# Patient Record
Sex: MALE | Race: WHITE | NOT HISPANIC OR LATINO | Employment: UNEMPLOYED | URBAN - METROPOLITAN AREA
[De-identification: names, ages, dates, MRNs, and addresses within clinical notes are randomized per-mention and may not be internally consistent; named-entity substitution may affect disease eponyms.]

---

## 2019-04-29 LAB
LEFT EYE DIABETIC RETINOPATHY: NORMAL
RIGHT EYE DIABETIC RETINOPATHY: NORMAL

## 2019-05-17 LAB
LEFT EYE DIABETIC RETINOPATHY: NORMAL
RIGHT EYE DIABETIC RETINOPATHY: NORMAL

## 2019-06-03 ENCOUNTER — TELEPHONE (OUTPATIENT)
Dept: NEUROLOGY | Facility: CLINIC | Age: 74
End: 2019-06-03

## 2019-06-19 ENCOUNTER — CONSULT (OUTPATIENT)
Dept: NEUROLOGY | Facility: CLINIC | Age: 74
End: 2019-06-19
Payer: COMMERCIAL

## 2019-06-19 VITALS
HEART RATE: 75 BPM | HEIGHT: 68 IN | DIASTOLIC BLOOD PRESSURE: 90 MMHG | RESPIRATION RATE: 16 BRPM | SYSTOLIC BLOOD PRESSURE: 162 MMHG | BODY MASS INDEX: 28.75 KG/M2 | WEIGHT: 189.7 LBS

## 2019-06-19 DIAGNOSIS — R41.3 MEMORY LOSS: Primary | ICD-10-CM

## 2019-06-19 DIAGNOSIS — F32.A ANXIETY AND DEPRESSION: ICD-10-CM

## 2019-06-19 DIAGNOSIS — G47.33 OBSTRUCTIVE SLEEP APNEA (ADULT) (PEDIATRIC): ICD-10-CM

## 2019-06-19 DIAGNOSIS — R43.0 ANOSMIA: ICD-10-CM

## 2019-06-19 DIAGNOSIS — F41.9 ANXIETY AND DEPRESSION: ICD-10-CM

## 2019-06-19 PROCEDURE — 99203 OFFICE O/P NEW LOW 30 MIN: CPT | Performed by: PSYCHIATRY & NEUROLOGY

## 2019-06-19 RX ORDER — LOSARTAN POTASSIUM 100 MG/1
100 TABLET ORAL DAILY
Refills: 3 | COMMUNITY
Start: 2019-03-15

## 2019-06-19 RX ORDER — AMLODIPINE BESYLATE 5 MG/1
5 TABLET ORAL DAILY
COMMUNITY
Start: 2019-03-13

## 2019-06-19 RX ORDER — TAMSULOSIN HYDROCHLORIDE 0.4 MG/1
CAPSULE ORAL
COMMUNITY
Start: 2019-04-11

## 2019-06-19 RX ORDER — CYCLOSPORINE 0.5 MG/ML
EMULSION OPHTHALMIC
Refills: 12 | COMMUNITY
Start: 2019-05-29

## 2019-06-19 RX ORDER — SIMVASTATIN 20 MG
TABLET ORAL
COMMUNITY
Start: 2019-03-28

## 2019-06-19 RX ORDER — MINERAL OIL AND WHITE PETROLATUM 30; 940 MG/G; MG/G
OINTMENT OPHTHALMIC DAILY
COMMUNITY

## 2019-08-22 ENCOUNTER — HOSPITAL ENCOUNTER (OUTPATIENT)
Dept: MRI IMAGING | Facility: HOSPITAL | Age: 74
Discharge: HOME | End: 2019-08-22
Payer: COMMERCIAL

## 2019-08-22 ENCOUNTER — HOSPITAL ENCOUNTER (OUTPATIENT)
Dept: NEUROLOGY | Facility: HOSPITAL | Age: 74
Discharge: HOME | End: 2019-08-22
Payer: COMMERCIAL

## 2019-08-22 DIAGNOSIS — R41.3 MEMORY LOSS: ICD-10-CM

## 2019-08-22 DIAGNOSIS — F32.A ANXIETY AND DEPRESSION: ICD-10-CM

## 2019-08-22 DIAGNOSIS — F41.9 ANXIETY AND DEPRESSION: ICD-10-CM

## 2019-08-22 PROCEDURE — 95819 EEG AWAKE AND ASLEEP: CPT | Performed by: PSYCHIATRY & NEUROLOGY

## 2019-08-22 PROCEDURE — 70551 MRI BRAIN STEM W/O DYE: CPT

## 2019-08-22 PROCEDURE — 95816 EEG AWAKE AND DROWSY: CPT

## 2019-08-22 NOTE — LETTER
[address] [place] [address]  Sander Asencio      August 23, 2019    MRN: [id number]     Phone: [phone]     Dear Mr  Zeina Hayes recently had a(n) MRI performed  August 22, 2019 at  [address] [place] [address] that was requested by Dion Drake MD  The study was reviewed by a radiologist, which is a physician who specializes in medical imaging  The radiologist issued a report describing his or her findings  In that report there was a finding that the radiologist felt warranted further discussion with your health care provider and that discussion would be beneficial to you  The results were sent to Dion Drake MD on 8/23/2019  We recommend that you contact Dion Drake MD  or set up an appointment to discuss the results of the imaging test  If you have already heard from Dion Drake MD regarding the results of your study, you can disregard this letter  This letter is not meant to alarm you, but intended to encourage you to follow-up on your results with the provider that sent you for the imaging study  In addition, we have enclosed answers to frequently asked questions by other patients who have also received a letter to review results with their health care provider (see page two)  Thank you for choosing [address] [place] [address] for your medical imaging needs  FREQUENTLY ASKED QUESTIONS    1  Why am I receiving this letter? [facility] requires us to notify patients who have findings on imaging exams that may require more testing or follow-up with a health professional within the next 3 months  2  How serious is the finding on the imaging test?  This letter is sent to all patients who may need follow-up or more testing within the next 3 months  Receiving this letter does not necessarily mean you have a life-threatening imaging finding or disease  Recommendations in the radiologists imaging report are general in nature and it is up to your healthcare provider to say whether those recommendations make sense for your situation  You are strongly encouraged to talk to your health care provider about the results and ask whether additional steps need to be taken  3  Where can I get a copy of the final report for my recent radiology exam?  To get a full copy of the report you can access your records online at [email or web address] or please contact [address] [place] [address] Department at [phone] Monday through Friday between 8 am and 6 pm          4  What do I need to do now? Please contact your health care provider who requested the imaging study to discuss what further actions (if any) are needed  You may have already heard from (your ordering provider) in regard to this test in which case you can disregard this letter  NOTICE IN ACCORDANCE WITH THE [facility] PATIENT TEST RESULT INFORMATION ACT OF 2018    You are receiving this notice as a result of a determination by your diagnostic imaging service that further discussions of your test results are warranted and would be beneficial to you  The complete results of your test or tests have been or will be sent to the health care practitioner that ordered the test or tests  It is recommended that you contact your health care practitioner to discuss your results as soon as possible

## 2019-08-23 DIAGNOSIS — I63.511 ACUTE RIGHT ARTERIAL ISCHEMIC STROKE, MCA (MIDDLE CEREBRAL ARTERY) (HCC): Primary | ICD-10-CM

## 2019-08-23 NOTE — RESULT ENCOUNTER NOTE
Pt does not have MyChart and I am covering for Dr Merle Finnegan' inbox  Received call from Radiology for alert on Pt's MRI scan - he has a small punctate diffusion restriction lesion (small stroke) that may be recent to a week or two ago  It is in a location that is not expected to contribute much to his memory symptoms  However, given his present vascular risk factors as noted by Dr Merle Finnegan, he should have a stroke workup done to find out the cause  He has no known hx of stroke or TIA per note  Also, the EEG done yesterday appeared normal      Will be ordering:   - CT Angiogram of the head and neck  - Echocardiogram trans thoracic  - lipid panel  And advise taking Aspirin 81mg daily until otherwise instructed  Thank you

## 2019-08-28 ENCOUNTER — OFFICE VISIT (OUTPATIENT)
Dept: NEUROLOGY | Facility: CLINIC | Age: 74
End: 2019-08-28
Payer: COMMERCIAL

## 2019-08-28 ENCOUNTER — TELEPHONE (OUTPATIENT)
Dept: NEUROLOGY | Facility: CLINIC | Age: 74
End: 2019-08-28

## 2019-08-28 VITALS
RESPIRATION RATE: 16 BRPM | SYSTOLIC BLOOD PRESSURE: 162 MMHG | HEART RATE: 82 BPM | BODY MASS INDEX: 28.31 KG/M2 | HEIGHT: 68 IN | DIASTOLIC BLOOD PRESSURE: 84 MMHG | WEIGHT: 186.8 LBS

## 2019-08-28 DIAGNOSIS — R41.3 MEMORY LOSS: Primary | ICD-10-CM

## 2019-08-28 DIAGNOSIS — I63.81 LACUNAR STROKE (HCC): ICD-10-CM

## 2019-08-28 DIAGNOSIS — F41.9 ANXIETY AND DEPRESSION: ICD-10-CM

## 2019-08-28 DIAGNOSIS — G47.33 OBSTRUCTIVE SLEEP APNEA (ADULT) (PEDIATRIC): ICD-10-CM

## 2019-08-28 DIAGNOSIS — F32.A ANXIETY AND DEPRESSION: ICD-10-CM

## 2019-08-28 DIAGNOSIS — R90.89 ABNORMAL BRAIN MRI: ICD-10-CM

## 2019-08-28 PROCEDURE — 99214 OFFICE O/P EST MOD 30 MIN: CPT | Performed by: PSYCHIATRY & NEUROLOGY

## 2019-08-28 RX ORDER — ASPIRIN 81 MG/1
81 TABLET ORAL DAILY
COMMUNITY

## 2019-08-28 RX ORDER — ZOLPIDEM TARTRATE 5 MG/1
5 TABLET ORAL
Refills: 3 | COMMUNITY
Start: 2019-08-02

## 2019-08-28 NOTE — TELEPHONE ENCOUNTER
----- Message from Slick Ennis MD sent at 8/23/2019  1:41 PM EDT -----  Pt does not have MyChart and I am covering for Dr Carlos Roque' inbox  Received call from Radiology for alert on Pt's MRI scan - he has a small punctate diffusion restriction lesion (small stroke) that may be recent to a week or two ago  It is in a location that is not expected to contribute much to his memory symptoms  However, given his present vascular risk factors as noted by Dr Carlos Roque, he should have a stroke workup done to find out the cause  He has no known hx of stroke or TIA per note  Also, the EEG done yesterday appeared normal      Will be ordering:   - CT Angiogram of the head and neck  - Echocardiogram trans thoracic  - lipid panel  And advise taking Aspirin 81mg daily until otherwise instructed  Thank you

## 2019-08-28 NOTE — PROGRESS NOTES
Patient ID: Glenys Phan is a 72 y o  male  Assessment/Plan:    Disturbance of memory       Obstructive sleep apnea (adult) (pediatric)       Anxiety and depression       Abnormal brain MRI      Again, my major suspicion is that the most significant culprits involved with his described memory issues are related to his at one point untreated obstructive sleep apnea as well as an ongoing issue with depression  He has now been faithfully utilizing his CPAP machine and describes himself as feeling more alert, with clearer thinking and his daughter agreed  Both he and she also feel that his memory has improved as well  With regard to his depression, he feels that this is becoming less evident as he moves further past the loss of his wife  However, there still appears to be an element lingering depression as per the daughter's observation  There may well be a small vessel cerebrovascular component to his ongoing memory issues  He does have scattered microangiopathic change  Concern was raised as result of a tiny foci of subacute ischemia involving the right centrum semiovale apparent on his brain MRI  I suspect that this is simply an incidental finding as chronic changes will at some point in time be subacute and he offers no symptoms to suggest any clinical correlate  Nonetheless, he will now undergo a more broad stroke based evaluation  --recently had a carotid ultrasound which reportedly demonstrated no flow consequential stenosis  Will be obtained those results for formal review  --to further assess, intracranial MRA along with 2D echocardiogram   --fasting lipid profile  --continue his daily 81 mg aspirin along with his statin  --will be meeting with his PCP early next month to further discuss his depression, which although improved still is apparent  --she will continue to religiously use his CPAP    --above reviewed in detail with patient and with daughter and both acknowledged an understanding  I spent a total of 40 min with the patient with greater than 50% of that time spent counseling and coordinating his care, specifically discussing his diagnosis, additional tests, and discussing the case with his care team, as detailed above  He will follow up after completion of his additional study  Subjective:    HPI  The patient, now 72years of age, returns to further assess the status of his described memory issues, which is the original reason for his presentation  He was accompanied today by his daughter, Gurdeep Sanchez  After his initial evaluation, he was felt to have significant components from depressio and untreated obstructive sleep apnea  He has been faithfully using his CPAP and describes himself as feeling overall much better, describing himself as brighter during the day, clear thinking, and improved memory  His daughter agreed  He also feels that his depression although still a bit problematic has improved this further along he has moved from the death of his wife  Is functioning at a high level  Did have his additional study performed  Results were reviewed with patient and his daughter  TSH normal at 2 010  B12 normal at 490  Folate normal at 11 60  Routine EEG study was normal   MRI brain did demonstrate scattered microangiopathic changes of chronic nature  In addition, there was a tiny focus of change in the right centrum semiovale suggestive of a subacute ischemic injury  Reportedly had a carotid ultrasound performed this past spring and according to patient and daughter the study demonstrated no evident flow consequential stenosis  However, I will be requesting a formal report for review  Past Medical History:   Diagnosis Date    Anxiety and depression     Arthritis     Blurred vision     Diabetes mellitus (HCC)     Dry eyes     Eye pain     Hearing loss     Hypertension     Memory loss     Migraines     Sinus congestion      History reviewed  No pertinent surgical history  Social History     Socioeconomic History    Marital status:      Spouse name: None    Number of children: None    Years of education: None    Highest education level: None   Occupational History    None   Social Needs    Financial resource strain: None    Food insecurity:     Worry: None     Inability: None    Transportation needs:     Medical: None     Non-medical: None   Tobacco Use    Smoking status: Never Smoker    Smokeless tobacco: Never Used   Substance and Sexual Activity    Alcohol use:  Yes    Drug use: Never    Sexual activity: None   Lifestyle    Physical activity:     Days per week: None     Minutes per session: None    Stress: None   Relationships    Social connections:     Talks on phone: None     Gets together: None     Attends [employer] service: None     Active member of club or organization: None     Attends meetings of clubs or organizations: None     Relationship status: None    Intimate partner violence:     Fear of current or ex partner: None     Emotionally abused: None     Physically abused: None     Forced sexual activity: None   Other Topics Concern    None   Social History Narrative    None     Family History   Problem Relation Age of Onset    Dementia Mother     Heart attack Father      No Known Allergies    Current Outpatient Medications:     amLODIPine (NORVASC) 5 mg tablet, Take 5 mg by mouth daily, Disp: , Rfl:     aspirin (ECOTRIN LOW STRENGTH) 81 mg EC tablet, Take 81 mg by mouth daily, Disp: , Rfl:     losartan (COZAAR) 100 MG tablet, Take 100 mg by mouth daily, Disp: , Rfl: 3    metFORMIN (GLUCOPHAGE) 500 mg tablet, TAKE 1 TABLET BY MOUTH EVERY DAY WITH SUPPER, Disp: , Rfl:     RESTASIS 0 05 % ophthalmic emulsion, INSTILL 1 DROP INTO BOTH EYES TWICE A DAY, Disp: , Rfl: 12    simvastatin (ZOCOR) 20 mg tablet, TAKE 1 TABLET BY MOUTH EVERY DAY, Disp: , Rfl:     tamsulosin (FLOMAX) 0 4 mg, TAKE 2 CAPSULES BY MOUTH EVERY DAY, Disp: , Rfl:     White Petrolatum-Mineral Oil (SYSTANE NIGHTTIME) OINT, daily, Disp: , Rfl:     zolpidem (AMBIEN) 5 mg tablet, Take 5 mg by mouth daily at bedtime as needed, Disp: , Rfl: 3    Objective:    Blood pressure 162/84, pulse 82, resp  rate 16, height 5' 7 5" (1 715 m), weight 84 7 kg (186 lb 12 8 oz)  Physical Exam  Head normocephalic  No audible anterior neck bruits  Lungs clear to auscultation  Rhythm regular  GI (abdomen) soft nontender  No significant lower extremity edema  Neurological Exam  Alert  Very pleasantly interactive and appropriately conversational  No dysarthria  Answered correctly when asked name and birth date  Correctly stated the year, the month and the date date  Correctly stated his location  No difficulties with naming or repetition  Did well with proverb interpretation  Able to accurately perform simple calculations  3/3 object recall  Formal neuro cognitive testing was not undertaken today as was just done on his initial appointment  In review he scored a perfect 30/30 on MMSE, a very acceptable 17/18 on a frontal assessment battery and 4/4 on clock draw  His depression scale on his initial appointment was 18/30, suggesting ongoing moderate depression  Depression scale re-administered today with his score at 9/30 correlate with his history of slowly improving depression  Gait independent  Full symmetrical strength throughout the 4 extremities  Tone normal   No tone increase or cogwheeling with contralateral distraction maneuver  ROS:    Review of Systems   Constitutional: Positive for fatigue  Negative for appetite change and fever  HENT: Negative  Negative for hearing loss, tinnitus, trouble swallowing and voice change  Eyes: Negative  Negative for photophobia and pain  Respiratory: Negative  Negative for shortness of breath  Cardiovascular: Negative  Negative for palpitations  Gastrointestinal: Negative    Negative for nausea and vomiting  Endocrine: Negative  Negative for cold intolerance and heat intolerance  Genitourinary: Negative  Negative for dysuria, frequency and urgency  Musculoskeletal: Negative  Negative for myalgias and neck pain  Skin: Negative  Negative for rash  Neurological: Negative  Negative for dizziness, tremors, seizures, syncope, facial asymmetry, speech difficulty, weakness, light-headedness, numbness and headaches  Hematological: Negative  Does not bruise/bleed easily  Psychiatric/Behavioral: Positive for confusion and sleep disturbance  Negative for hallucinations  Memory issues       I personally reviewed the ROS that was entered by the medical assistant  *Please note this document was created using voice recognition software and may contain sound-alike word errors  *

## 2019-08-28 NOTE — ASSESSMENT & PLAN NOTE
Again, my major suspicion is that the most significant culprits involved with his described memory issues are related to his at one point untreated obstructive sleep apnea as well as an ongoing issue with depression  He has now been faithfully utilizing his CPAP machine and describes himself as feeling more alert, with clearer thinking and his daughter agreed  Both he and she also feel that his memory has improved as well  With regard to his depression, he feels that this is becoming less evident as he moves further past the loss of his wife  However, there still appears to be an element lingering depression as per the daughter's observation  There may well be a small vessel cerebrovascular component to his ongoing memory issues  He does have scattered microangiopathic change  Concern was raised as result of a tiny foci of subacute ischemia involving the right centrum semiovale apparent on his brain MRI  I suspect that this is simply an incidental finding as chronic changes will at some point in time be subacute and he offers no symptoms to suggest any clinical correlate  Nonetheless, he will now undergo a more broad stroke based evaluation  --recently had a carotid ultrasound which reportedly demonstrated no flow consequential stenosis  Will be obtained those results for formal review  --to further assess, intracranial MRA along with 2D echocardiogram   --fasting lipid profile  --continue his daily 81 mg aspirin along with his statin  --will be meeting with his PCP early next month to further discuss his depression, which although improved still is apparent  --she will continue to religiously use his CPAP  --above reviewed in detail with patient and with daughter and both acknowledged an understanding

## 2019-09-06 NOTE — TELEPHONE ENCOUNTER
Called and Left a message on pt's daughter's  answering machine for a call back     Letter mailed home

## 2019-09-12 NOTE — TELEPHONE ENCOUNTER
Pt's daughter called back  Made her aware of below    She states that after MRI was done they saw dr garcia on 8/28 and results were address, additional testing ordered ans scheduled

## 2019-09-20 ENCOUNTER — LAB (OUTPATIENT)
Dept: LAB | Facility: HOSPITAL | Age: 74
End: 2019-09-20
Attending: PSYCHIATRY & NEUROLOGY
Payer: COMMERCIAL

## 2019-09-20 ENCOUNTER — HOSPITAL ENCOUNTER (OUTPATIENT)
Dept: RADIOLOGY | Facility: HOSPITAL | Age: 74
Discharge: HOME | End: 2019-09-20
Payer: COMMERCIAL

## 2019-09-20 DIAGNOSIS — E78.5 HYPERLIPIDEMIA, UNSPECIFIED HYPERLIPIDEMIA TYPE: Primary | ICD-10-CM

## 2019-09-20 DIAGNOSIS — I63.81 LACUNAR STROKE (HCC): ICD-10-CM

## 2019-09-20 DIAGNOSIS — Z12.9 SCREENING FOR MALIGNANT NEOPLASM: ICD-10-CM

## 2019-09-20 DIAGNOSIS — I63.511 ACUTE RIGHT ARTERIAL ISCHEMIC STROKE, MCA (MIDDLE CEREBRAL ARTERY) (HCC): ICD-10-CM

## 2019-09-20 LAB
ALBUMIN SERPL BCP-MCNC: 3.8 G/DL (ref 3.5–5)
ALP SERPL-CCNC: 68 U/L (ref 46–116)
ALT SERPL W P-5'-P-CCNC: 33 U/L (ref 12–78)
ANION GAP SERPL CALCULATED.3IONS-SCNC: 3 MMOL/L (ref 4–13)
AST SERPL W P-5'-P-CCNC: 11 U/L (ref 5–45)
BASOPHILS # BLD AUTO: 0.07 THOUSANDS/ΜL (ref 0–0.1)
BASOPHILS NFR BLD AUTO: 1 % (ref 0–1)
BILIRUB SERPL-MCNC: 0.79 MG/DL (ref 0.2–1)
BUN SERPL-MCNC: 16 MG/DL (ref 5–25)
CALCIUM ALBUM COR SERPL-MCNC: 10.8 MG/DL (ref 8.3–10.1)
CALCIUM SERPL-MCNC: 10.6 MG/DL (ref 8.3–10.1)
CHLORIDE SERPL-SCNC: 112 MMOL/L (ref 100–108)
CHOLEST SERPL-MCNC: 152 MG/DL (ref 50–200)
CO2 SERPL-SCNC: 26 MMOL/L (ref 21–32)
CREAT SERPL-MCNC: 0.98 MG/DL (ref 0.6–1.3)
EOSINOPHIL # BLD AUTO: 0.35 THOUSAND/ΜL (ref 0–0.61)
EOSINOPHIL NFR BLD AUTO: 5 % (ref 0–6)
ERYTHROCYTE [DISTWIDTH] IN BLOOD BY AUTOMATED COUNT: 13.6 % (ref 11.6–15.1)
EST. AVERAGE GLUCOSE BLD GHB EST-MCNC: 134 MG/DL
GFR SERPL CREATININE-BSD FRML MDRD: 77 ML/MIN/1.73SQ M
GLUCOSE P FAST SERPL-MCNC: 139 MG/DL (ref 65–99)
HBA1C MFR BLD: 6.3 % (ref 4.2–6.3)
HCT VFR BLD AUTO: 45.2 % (ref 36.5–49.3)
HDLC SERPL-MCNC: 42 MG/DL (ref 40–60)
HGB BLD-MCNC: 15.2 G/DL (ref 12–17)
IMM GRANULOCYTES # BLD AUTO: 0.01 THOUSAND/UL (ref 0–0.2)
IMM GRANULOCYTES NFR BLD AUTO: 0 % (ref 0–2)
LDLC SERPL CALC-MCNC: 75 MG/DL (ref 0–100)
LYMPHOCYTES # BLD AUTO: 1.78 THOUSANDS/ΜL (ref 0.6–4.47)
LYMPHOCYTES NFR BLD AUTO: 26 % (ref 14–44)
MCH RBC QN AUTO: 29.5 PG (ref 26.8–34.3)
MCHC RBC AUTO-ENTMCNC: 33.6 G/DL (ref 31.4–37.4)
MCV RBC AUTO: 88 FL (ref 82–98)
MONOCYTES # BLD AUTO: 0.48 THOUSAND/ΜL (ref 0.17–1.22)
MONOCYTES NFR BLD AUTO: 7 % (ref 4–12)
NEUTROPHILS # BLD AUTO: 4.04 THOUSANDS/ΜL (ref 1.85–7.62)
NEUTS SEG NFR BLD AUTO: 61 % (ref 43–75)
NONHDLC SERPL-MCNC: 110 MG/DL
NRBC BLD AUTO-RTO: 0 /100 WBCS
PLATELET # BLD AUTO: 136 THOUSANDS/UL (ref 149–390)
PMV BLD AUTO: 10.5 FL (ref 8.9–12.7)
POTASSIUM SERPL-SCNC: 4.6 MMOL/L (ref 3.5–5.3)
PROT SERPL-MCNC: 7.3 G/DL (ref 6.4–8.2)
PSA SERPL-MCNC: 1.9 NG/ML (ref 0–4)
RBC # BLD AUTO: 5.16 MILLION/UL (ref 3.88–5.62)
SODIUM SERPL-SCNC: 141 MMOL/L (ref 136–145)
TRIGL SERPL-MCNC: 176 MG/DL
WBC # BLD AUTO: 6.73 THOUSAND/UL (ref 4.31–10.16)

## 2019-09-20 PROCEDURE — G0103 PSA SCREENING: HCPCS

## 2019-09-20 PROCEDURE — 70544 MR ANGIOGRAPHY HEAD W/O DYE: CPT

## 2019-09-20 PROCEDURE — 36415 COLL VENOUS BLD VENIPUNCTURE: CPT

## 2019-09-20 PROCEDURE — 85025 COMPLETE CBC W/AUTO DIFF WBC: CPT

## 2019-09-20 PROCEDURE — 83036 HEMOGLOBIN GLYCOSYLATED A1C: CPT

## 2019-09-20 PROCEDURE — 80061 LIPID PANEL: CPT

## 2019-09-20 PROCEDURE — 80053 COMPREHEN METABOLIC PANEL: CPT

## 2019-09-20 NOTE — RESULT ENCOUNTER NOTE
Documentation that I informed pt of the blood testing results back ordered as part of an initial stoke workup once we learned about his MRI results  I was covering for Dr Karen Zacarias at the time  He has slightly elevated triglycerides, slightly elevated calcium, and slightly low platelet count  Overall, however, these results are not significant enough to affect his stroke risk  Recommended continuing the same plan with what Dr Karen Zacarias had last instructed  Further questions can be discussed with Dr Karen Zacarias

## 2019-11-01 ENCOUNTER — TELEPHONE (OUTPATIENT)
Dept: NEUROLOGY | Facility: CLINIC | Age: 74
End: 2019-11-01

## 2019-11-01 ENCOUNTER — OFFICE VISIT (OUTPATIENT)
Dept: NEUROLOGY | Facility: CLINIC | Age: 74
End: 2019-11-01
Payer: COMMERCIAL

## 2019-11-01 VITALS
HEIGHT: 68 IN | DIASTOLIC BLOOD PRESSURE: 88 MMHG | RESPIRATION RATE: 17 BRPM | HEART RATE: 79 BPM | SYSTOLIC BLOOD PRESSURE: 166 MMHG | WEIGHT: 188 LBS | BODY MASS INDEX: 28.49 KG/M2

## 2019-11-01 DIAGNOSIS — R41.3 MEMORY LOSS: Primary | ICD-10-CM

## 2019-11-01 DIAGNOSIS — R90.89 ABNORMAL BRAIN MRI: ICD-10-CM

## 2019-11-01 DIAGNOSIS — G47.33 OBSTRUCTIVE SLEEP APNEA (ADULT) (PEDIATRIC): ICD-10-CM

## 2019-11-01 DIAGNOSIS — F32.A ANXIETY AND DEPRESSION: ICD-10-CM

## 2019-11-01 DIAGNOSIS — F41.9 ANXIETY AND DEPRESSION: ICD-10-CM

## 2019-11-01 PROCEDURE — 99213 OFFICE O/P EST LOW 20 MIN: CPT | Performed by: PSYCHIATRY & NEUROLOGY

## 2019-11-01 NOTE — LETTER
November 1, 2019     Chanda Torrez MD  [address]  [address] [place] [id number]    Patient: Gerri Oh   YOB: 1945   Date of Visit: 11/1/2019       Dear Dr Philip Field:    Thank you for referring Donnie Braden to me for evaluation  Below are my notes for this consultation  If you have questions, please do not hesitate to call me  I look forward to following your patient along with you  Sincerely,        Santiago Ascencio MD        CC: No Recipients  Santiago Ascencio MD  11/1/2019  1:02 PM  Sign at close encounter  Patient ID: Gerri Oh is a 69 y o  male  Assessment/Plan:    Disturbance of memory       Obstructive sleep apnea (adult) (pediatric)       Anxiety and depression       Abnormal brain MRI      Has done exceedingly well since his last appointment  With the treatment of his obstructive sleep apnea in the overall improvement in his depressive issues he is again functioning at a high level  With regard to the small vessel change and changes on brain MRI, he has had again no clinical symptoms that would suggest a symptomatic cerebral vascular ischemic event  His follow-up intracranial MRA demonstrated no flow restrictive stenosis or other significant abnormality  An earlier carotid ultrasound reportedly demonstrated no flow restrictive disease  Unfortunately, his 2D echocardiogram has not as yet been completed  --will be sending request to both [facility] and [facility] for an official report with regard to his carotid ultrasound  --his 2D echocardiogram will be scheduled  He or his daughter will call to review results after completed  --he will continue his daily aspirin along with statin  --he will continue to religiously use his CPAP  He will follow up in 6 months or as needed      Subjective:    HPI  Patient, 69years of age, returns to review several situations which include issues with memory, depression and anxiety, obstructive sleep apnea and a tiny area of acute ischemia found incidentally on a brain MRI  He was accompanied today by his daughter, Katie Sánchez  Since he was last seen he has done very, very well  As he moves further away from the loss of his wife and continues to use his CPAP on a regular basis, his depressive mood has significantly improved  He is described as more alert and energetic and engaging  He maintains his basic ADLs  He does his own bill paying and banking accurately and on time  His driving with no reported accidents, incidence or moving citations and daughter has no concerns with regard to his driving capability and safety  He has had no symptoms that would suggest any clinical cerebral vascular ischemic event  He has continued on his daily 81 mg aspirin along with a statin  I have not yet received the formal report of his carotid ultrasound, but it has been reported as showing no flow restrictive stenosis  His intracranial MRA has now been completed and it demonstrates no difficulty with restricted flow or other abnormality  His 2D echocardiogram for some reason has not as yet been done  He did have his lipid profile performed with total cholesterol 152, triglycerides 176 and his LDL at 75  Past Medical History:   Diagnosis Date    Anxiety and depression     Arthritis     Blurred vision     Diabetes mellitus (HCC)     Dry eyes     Eye pain     Hearing loss     Hypertension     Memory loss     Migraines     Sinus congestion      History reviewed  No pertinent surgical history  Social History     Socioeconomic History    Marital status:       Spouse name: None    Number of children: None    Years of education: None    Highest education level: None   Occupational History    None   Social Needs    Financial resource strain: None    Food insecurity:     Worry: None     Inability: None    Transportation needs:     Medical: None     Non-medical: None   Tobacco Use  Smoking status: Never Smoker    Smokeless tobacco: Never Used   Substance and Sexual Activity    Alcohol use: Yes    Drug use: Never    Sexual activity: None   Lifestyle    Physical activity:     Days per week: None     Minutes per session: None    Stress: None   Relationships    Social connections:     Talks on phone: None     Gets together: None     Attends Confucianism service: None     Active member of club or organization: None     Attends meetings of clubs or organizations: None     Relationship status: None    Intimate partner violence:     Fear of current or ex partner: None     Emotionally abused: None     Physically abused: None     Forced sexual activity: None   Other Topics Concern    None   Social History Narrative    None     Family History   Problem Relation Age of Onset    Dementia Mother     Heart attack Father      No Known Allergies    Current Outpatient Medications:     amLODIPine (NORVASC) 5 mg tablet, Take 5 mg by mouth daily, Disp: , Rfl:     aspirin (ECOTRIN LOW STRENGTH) 81 mg EC tablet, Take 81 mg by mouth daily, Disp: , Rfl:     losartan (COZAAR) 100 MG tablet, Take 100 mg by mouth daily, Disp: , Rfl: 3    metFORMIN (GLUCOPHAGE) 500 mg tablet, TAKE 1 TABLET BY MOUTH EVERY DAY WITH SUPPER, Disp: , Rfl:     RESTASIS 0 05 % ophthalmic emulsion, INSTILL 1 DROP INTO BOTH EYES TWICE A DAY, Disp: , Rfl: 12    simvastatin (ZOCOR) 20 mg tablet, TAKE 1 TABLET BY MOUTH EVERY DAY, Disp: , Rfl:     tamsulosin (FLOMAX) 0 4 mg, TAKE 2 CAPSULES BY MOUTH EVERY DAY, Disp: , Rfl:     White Petrolatum-Mineral Oil (SYSTANE NIGHTTIME) OINT, daily, Disp: , Rfl:     zolpidem (AMBIEN) 5 mg tablet, Take 5 mg by mouth daily at bedtime as needed, Disp: , Rfl: 3    Objective:    Blood pressure 166/88, pulse 79, resp  rate 17, height 5' 8" (1 727 m), weight 85 3 kg (188 lb)  Physical Exam  Head normocephalic  Eyes nonicteric  No audible anterior neck bruits    Lungs clear to auscultation  Rhythm regular  GI (abdomen) soft nontender  Bowel sounds present  No significant lower extremity edema  Neurological Exam  Alert  Engaging  Appeared in very good mood  Answered correctly when asked name and location  Answered correctly with year, month and date  3/3 object recall  No difficulties with naming or with repetition  Able to perform simple calculations quickly and accurately  Full bedside neuro cognitive testing was not performed today as was just done earlier  In review he scored a perfect 30/30 on MMSE, very acceptable 17/18 on frontal assessment battery and 4/4 on clock draw  Unremarkable spontaneous gait  Cranial nerves 2-12 tested and grossly intact  Full symmetrical strength throughout the 4 extremities  No upper extremity drift  Muscle stretch reflexes bilaterally 2 throughout the upper extremities, at the knees and at the ankles  ROS:    Review of Systems   Constitutional: Positive for appetite change and fatigue  Negative for fever  HENT: Positive for hearing loss  Negative for tinnitus, trouble swallowing and voice change  Eyes: Negative  Negative for photophobia and pain  Respiratory: Negative  Negative for shortness of breath  Cardiovascular: Negative  Negative for palpitations  Gastrointestinal: Negative  Negative for nausea and vomiting  Endocrine: Negative  Negative for cold intolerance and heat intolerance  Genitourinary: Negative  Negative for dysuria, frequency and urgency  Musculoskeletal: Negative  Negative for myalgias and neck pain  Skin: Negative  Negative for rash  Allergic/Immunologic: Negative  Neurological: Positive for dizziness and light-headedness  Negative for tremors, seizures, syncope, facial asymmetry, speech difficulty, weakness, numbness and headaches  Hematological: Negative  Does not bruise/bleed easily  Psychiatric/Behavioral: Positive for confusion and sleep disturbance   Negative for hallucinations  I personally reviewed the ROS that was entered by the medical assistant  *Please note this document was created using voice recognition software and may contain sound-alike word errors  *

## 2019-11-01 NOTE — PROGRESS NOTES
Patient ID: Ruthie De León is a 69 y o  male  Assessment/Plan:    Disturbance of memory       Obstructive sleep apnea (adult) (pediatric)       Anxiety and depression       Abnormal brain MRI      Has done exceedingly well since his last appointment  With the treatment of his obstructive sleep apnea in the overall improvement in his depressive issues he is again functioning at a high level  With regard to the small vessel change and changes on brain MRI, he has had again no clinical symptoms that would suggest a symptomatic cerebral vascular ischemic event  His follow-up intracranial MRA demonstrated no flow restrictive stenosis or other significant abnormality  An earlier carotid ultrasound reportedly demonstrated no flow restrictive disease  Unfortunately, his 2D echocardiogram has not as yet been completed  --will be sending request to both [facility] and [facility] for an official report with regard to his carotid ultrasound  --his 2D echocardiogram will be scheduled  He or his daughter will call to review results after completed  --he will continue his daily aspirin along with statin  --he will continue to religiously use his CPAP  He will follow up in 6 months or as needed  Subjective:    HPI  Patient, 69years of age, returns to review several situations which include issues with memory, depression and anxiety, obstructive sleep apnea and a tiny area of acute ischemia found incidentally on a brain MRI  He was accompanied today by his daughter, Patti Ruiz  Since he was last seen he has done very, very well  As he moves further away from the loss of his wife and continues to use his CPAP on a regular basis, his depressive mood has significantly improved  He is described as more alert and energetic and engaging  He maintains his basic ADLs  He does his own bill paying and banking accurately and on time    His driving with no reported accidents, incidence or moving citations and daughter has no concerns with regard to his driving capability and safety  He has had no symptoms that would suggest any clinical cerebral vascular ischemic event  He has continued on his daily 81 mg aspirin along with a statin  I have not yet received the formal report of his carotid ultrasound, but it has been reported as showing no flow restrictive stenosis  His intracranial MRA has now been completed and it demonstrates no difficulty with restricted flow or other abnormality  His 2D echocardiogram for some reason has not as yet been done  He did have his lipid profile performed with total cholesterol 152, triglycerides 176 and his LDL at 75  Past Medical History:   Diagnosis Date    Anxiety and depression     Arthritis     Blurred vision     Diabetes mellitus (HCC)     Dry eyes     Eye pain     Hearing loss     Hypertension     Memory loss     Migraines     Sinus congestion      History reviewed  No pertinent surgical history  Social History     Socioeconomic History    Marital status:      Spouse name: None    Number of children: None    Years of education: None    Highest education level: None   Occupational History    None   Social Needs    Financial resource strain: None    Food insecurity:     Worry: None     Inability: None    Transportation needs:     Medical: None     Non-medical: None   Tobacco Use    Smoking status: Never Smoker    Smokeless tobacco: Never Used   Substance and Sexual Activity    Alcohol use:  Yes    Drug use: Never    Sexual activity: None   Lifestyle    Physical activity:     Days per week: None     Minutes per session: None    Stress: None   Relationships    Social connections:     Talks on phone: None     Gets together: None     Attends Sabianism service: None     Active member of club or organization: None     Attends meetings of clubs or organizations: None     Relationship status: None    Intimate partner violence: Fear of current or ex partner: None     Emotionally abused: None     Physically abused: None     Forced sexual activity: None   Other Topics Concern    None   Social History Narrative    None     Family History   Problem Relation Age of Onset    Dementia Mother     Heart attack Father      No Known Allergies    Current Outpatient Medications:     amLODIPine (NORVASC) 5 mg tablet, Take 5 mg by mouth daily, Disp: , Rfl:     aspirin (ECOTRIN LOW STRENGTH) 81 mg EC tablet, Take 81 mg by mouth daily, Disp: , Rfl:     losartan (COZAAR) 100 MG tablet, Take 100 mg by mouth daily, Disp: , Rfl: 3    metFORMIN (GLUCOPHAGE) 500 mg tablet, TAKE 1 TABLET BY MOUTH EVERY DAY WITH SUPPER, Disp: , Rfl:     RESTASIS 0 05 % ophthalmic emulsion, INSTILL 1 DROP INTO BOTH EYES TWICE A DAY, Disp: , Rfl: 12    simvastatin (ZOCOR) 20 mg tablet, TAKE 1 TABLET BY MOUTH EVERY DAY, Disp: , Rfl:     tamsulosin (FLOMAX) 0 4 mg, TAKE 2 CAPSULES BY MOUTH EVERY DAY, Disp: , Rfl:     White Petrolatum-Mineral Oil (SYSTANE NIGHTTIME) OINT, daily, Disp: , Rfl:     zolpidem (AMBIEN) 5 mg tablet, Take 5 mg by mouth daily at bedtime as needed, Disp: , Rfl: 3    Objective:    Blood pressure 166/88, pulse 79, resp  rate 17, height 5' 8" (1 727 m), weight 85 3 kg (188 lb)  Physical Exam  Head normocephalic  Eyes nonicteric  No audible anterior neck bruits  Lungs clear to auscultation  Rhythm regular  GI (abdomen) soft nontender  Bowel sounds present  No significant lower extremity edema  Neurological Exam  Alert  Engaging  Appeared in very good mood  Answered correctly when asked name and location  Answered correctly with year, month and date  3/3 object recall  No difficulties with naming or with repetition  Able to perform simple calculations quickly and accurately  Full bedside neuro cognitive testing was not performed today as was just done earlier    In review he scored a perfect 30/30 on MMSE, very acceptable 17/18 on frontal assessment battery and 4/4 on clock draw  Unremarkable spontaneous gait  Cranial nerves 2-12 tested and grossly intact  Full symmetrical strength throughout the 4 extremities  No upper extremity drift  Muscle stretch reflexes bilaterally 2 throughout the upper extremities, at the knees and at the ankles  ROS:    Review of Systems   Constitutional: Positive for appetite change and fatigue  Negative for fever  HENT: Positive for hearing loss  Negative for tinnitus, trouble swallowing and voice change  Eyes: Negative  Negative for photophobia and pain  Respiratory: Negative  Negative for shortness of breath  Cardiovascular: Negative  Negative for palpitations  Gastrointestinal: Negative  Negative for nausea and vomiting  Endocrine: Negative  Negative for cold intolerance and heat intolerance  Genitourinary: Negative  Negative for dysuria, frequency and urgency  Musculoskeletal: Negative  Negative for myalgias and neck pain  Skin: Negative  Negative for rash  Allergic/Immunologic: Negative  Neurological: Positive for dizziness and light-headedness  Negative for tremors, seizures, syncope, facial asymmetry, speech difficulty, weakness, numbness and headaches  Hematological: Negative  Does not bruise/bleed easily  Psychiatric/Behavioral: Positive for confusion and sleep disturbance  Negative for hallucinations  I personally reviewed the ROS that was entered by the medical assistant  *Please note this document was created using voice recognition software and may contain sound-alike word errors  *

## 2019-11-01 NOTE — ASSESSMENT & PLAN NOTE
Has done exceedingly well since his last appointment  With the treatment of his obstructive sleep apnea in the overall improvement in his depressive issues he is again functioning at a high level  With regard to the small vessel change and changes on brain MRI, he has had again no clinical symptoms that would suggest a symptomatic cerebral vascular ischemic event  His follow-up intracranial MRA demonstrated no flow restrictive stenosis or other significant abnormality  An earlier carotid ultrasound reportedly demonstrated no flow restrictive disease  Unfortunately, his 2D echocardiogram has not as yet been completed  --will be sending request to both [facility] and [facility] for an official report with regard to his carotid ultrasound  --his 2D echocardiogram will be scheduled  He or his daughter will call to review results after completed  --he will continue his daily aspirin along with statin  --he will continue to religiously use his CPAP

## 2020-04-27 ENCOUNTER — TELEPHONE (OUTPATIENT)
Dept: NEUROLOGY | Facility: CLINIC | Age: 75
End: 2020-04-27